# Patient Record
(demographics unavailable — no encounter records)

---

## 2024-10-08 NOTE — CARDIOLOGY SUMMARY
[de-identified] : 10/8/2024. Sinus Rhythm. Right bundle branch block. [de-identified] : 8/28/20, Normal left ventricular size and systolic function. Mild diastolic dysfunction. Mild aortic regurgitation. Mild tricuspid regurgitation. Minimal mitral regurgitation. LVEF 56%. [de-identified] : 3/13/2023.  Left main: Normal.  Ostial LAD: 20% stenosis.  Patent proximal LAD stent with 10% in-stent-stenosis.  Mid LAD: 30% stenosis. OM2: 50% stenosis with FFR 0.95.  Mid RCA: 40% stenosis.\par  --------\par  9/21/20, Left Main: Mild diffuse disease. Mid LAD: The 50% stenosis. Proximal LAD: Patent stent. Second obtuse marginal: 50% stenosis. RCA: Mild diffuse disease. LVEF is 60% \par  ------------\par  Stent: 09/02/14, LAD - stent. \par

## 2024-10-08 NOTE — PHYSICAL EXAM
[No Acute Distress] : no acute distress [Normal S1, S2] : normal S1, S2 [Clear Lung Fields] : clear lung fields [Normal Gait] : normal gait [Alert and Oriented] : alert and oriented [Obese] : obese [de-identified] : No JVD [de-identified] : I/VI murmur [de-identified] : Warm, dry

## 2024-10-08 NOTE — HISTORY OF PRESENT ILLNESS
[FreeTextEntry1] : Sydnee Ortiz is a 90-year-old woman with a history of CAD, LAD stent (2014 -- patent on cath in 2020 but also with 50% mid LAD and OM2 stenoses), mild aortic valve insufficiency, right bundle branch block, pacemaker, paroxysmal atrial fibrillation (low burden), supraventricular tachycardia, NSVT on pacemaker interrogation, hypertension, hyperlipidemia, hypothyroidism, osteoarthritis, pancreatitis, and renal calculi who returns cardiac examination.  She has been feeling well since I last saw her.  She describes mild dyspnea when climbing stairs but not with other activities; no associated chest discomfort.  She describes a good baseline functional status.  * This visit was conducted as part of ongoing, longitudinal medical care for patient's chronic medical diagnoses and other issues.

## 2024-10-08 NOTE — REVIEW OF SYSTEMS
[Joint Pain] : joint pain [Chest Discomfort] : no chest discomfort [Palpitations] : no palpitations [FreeTextEntry2] : Stable weight [FreeTextEntry5] : Dyspnea when climbing stairs

## 2024-10-08 NOTE — DISCUSSION/SUMMARY
[With Me] : with me [___ Month(s)] : in [unfilled] month(s) [FreeTextEntry1] :  Coronary artery disease: No typical angina.  Dyspnea when climbing stairs but not with other activities and without associated chest discomfort; ECG similar to last tracing; I offered her an ischemia evaluation to assess for progression of disease, but she declined--says she will contact me if symptoms change/worsen. Continue medical management with aspirin, metoprolol, and atorvastatin.  Hypertension: Controlled; continue amlodipine, HCTZ, and ramipril.  Atrial fibrillation: Paroxysmal; continue Eliquis and metoprolol.  Hyperlipidemia:  Controlled; continue atorvastatin

## 2025-05-08 NOTE — CARDIOLOGY SUMMARY
[de-identified] : 5/8/25 Sinus Rhythm. Right bundle branch block. [de-identified] : 8/28/20, Normal left ventricular size and systolic function. Mild diastolic dysfunction. Mild aortic regurgitation. Mild tricuspid regurgitation. Minimal mitral regurgitation. LVEF 56%. [de-identified] : 12/13/2017   [employer]  [de-identified] : 3/13/2023.  Left main: Normal.  Ostial LAD: 20% stenosis.  Patent proximal LAD stent with 10% in-stent-stenosis.  Mid LAD: 30% stenosis. OM2: 50% stenosis with FFR 0.95.  Mid RCA: 40% stenosis.\par  --------\par  9/21/20, Left Main: Mild diffuse disease. Mid LAD: The 50% stenosis. Proximal LAD: Patent stent. Second obtuse marginal: 50% stenosis. RCA: Mild diffuse disease. LVEF is 60% \par  ------------\par  Stent: 09/02/14, LAD - stent. \par

## 2025-05-08 NOTE — PHYSICAL EXAM
[No Acute Distress] : no acute distress [Obese] : obese [Normal S1, S2] : normal S1, S2 [Clear Lung Fields] : clear lung fields [Normal Gait] : normal gait [Alert and Oriented] : alert and oriented [Soft] : abdomen soft [Normal Bowel Sounds] : normal bowel sounds [No Edema] : no edema [No Varicosities] : no varicosities [Normal Radial B/L] : normal radial B/L [Normal PT B/L] : normal PT B/L [Moves all extremities] : moves all extremities [de-identified] : No JVD [de-identified] : I/VI murmur [de-identified] : Warm, dry

## 2025-05-08 NOTE — HISTORY OF PRESENT ILLNESS
[FreeTextEntry1] : Sydnee Ortiz is a 91-year-old woman with a history of CAD, LAD stent (2014 -- patent on cath in 2020 but also with 50% mid LAD and OM2 stenoses), mild aortic valve insufficiency, right bundle branch block, pacemaker, paroxysmal atrial fibrillation (low burden), supraventricular tachycardia, NSVT on pacemaker interrogation, hypertension, hyperlipidemia, hypothyroidism, osteoarthritis, pancreatitis, and renal calculi who returns cardiac examination. patient used to see my associate dr Pearson     She has been feeling well  until yesterday she felt like her heart rate was racing  lasted for 20 minutes ,  patient did not transfer the data , patient  denies any chest pain or shortness of breath or dizziness ,    Patient blood blood pressure optimal   * This visit was conducted as part of ongoing, longitudinal medical care for patient's chronic medical diagnoses and other issues.

## 2025-05-08 NOTE — CARDIOLOGY SUMMARY
[de-identified] : 5/8/25 Sinus Rhythm. Right bundle branch block. [de-identified] : 8/28/20, Normal left ventricular size and systolic function. Mild diastolic dysfunction. Mild aortic regurgitation. Mild tricuspid regurgitation. Minimal mitral regurgitation. LVEF 56%. [de-identified] : 12/13/2017   [employer]  [de-identified] : 3/13/2023.  Left main: Normal.  Ostial LAD: 20% stenosis.  Patent proximal LAD stent with 10% in-stent-stenosis.  Mid LAD: 30% stenosis. OM2: 50% stenosis with FFR 0.95.  Mid RCA: 40% stenosis.\par  --------\par  9/21/20, Left Main: Mild diffuse disease. Mid LAD: The 50% stenosis. Proximal LAD: Patent stent. Second obtuse marginal: 50% stenosis. RCA: Mild diffuse disease. LVEF is 60% \par  ------------\par  Stent: 09/02/14, LAD - stent. \par

## 2025-05-08 NOTE — DISCUSSION/SUMMARY
[With Me] : with me [___ Month(s)] : in [unfilled] month(s) [EKG obtained to assist in diagnosis and management of assessed problem(s)] : EKG obtained to assist in diagnosis and management of assessed problem(s) [FreeTextEntry1] : Coronary artery disease: No typical angina.  Dyspnea when climbing stairs but not with other activities and without associated chest discomfort which is stable ; ECG similar to last tracing; Continue medical management with aspirin, metoprolol, and atorvastatin.  Hypertension: Controlled; continue amlodipine, HCTZ, and ramipril.  Palpitations : likely episodes of rapid Atrial fibrillation: Paroxysmal; continue Eliquis and metoprolol.  will obtain PPM interrogated   Hyperlipidemia:  Controlled; continue atorvastatin

## 2025-05-08 NOTE — PHYSICAL EXAM
[No Acute Distress] : no acute distress [Obese] : obese [Normal S1, S2] : normal S1, S2 [Clear Lung Fields] : clear lung fields [Normal Gait] : normal gait [Alert and Oriented] : alert and oriented [Soft] : abdomen soft [Normal Bowel Sounds] : normal bowel sounds [No Edema] : no edema [No Varicosities] : no varicosities [Normal Radial B/L] : normal radial B/L [Normal PT B/L] : normal PT B/L [Moves all extremities] : moves all extremities [de-identified] : No JVD [de-identified] : I/VI murmur [de-identified] : Warm, dry

## 2025-06-18 NOTE — CARDIOLOGY SUMMARY
[de-identified] : 5/8/25 Sinus Rhythm. Right bundle branch block. [de-identified] : 8/28/20, Normal left ventricular size and systolic function. Mild diastolic dysfunction. Mild aortic regurgitation. Mild tricuspid regurgitation. Minimal mitral regurgitation. LVEF 56%. [de-identified] : 12/13/2017   [employer]  [de-identified] : 3/13/2023.  Left main: Normal.  Ostial LAD: 20% stenosis.  Patent proximal LAD stent with 10% in-stent-stenosis.  Mid LAD: 30% stenosis. OM2: 50% stenosis with FFR 0.95.  Mid RCA: 40% stenosis.\par  --------\par  9/21/20, Left Main: Mild diffuse disease. Mid LAD: The 50% stenosis. Proximal LAD: Patent stent. Second obtuse marginal: 50% stenosis. RCA: Mild diffuse disease. LVEF is 60% \par  ------------\par  Stent: 09/02/14, LAD - stent. \par

## 2025-06-18 NOTE — PHYSICAL EXAM
[No Acute Distress] : no acute distress [Obese] : obese [Normal S1, S2] : normal S1, S2 [Clear Lung Fields] : clear lung fields [Soft] : abdomen soft [Normal Bowel Sounds] : normal bowel sounds [Normal Gait] : normal gait [No Edema] : no edema [No Varicosities] : no varicosities [Normal Radial B/L] : normal radial B/L [Normal PT B/L] : normal PT B/L [Moves all extremities] : moves all extremities [Alert and Oriented] : alert and oriented [de-identified] : No JVD [de-identified] : I/VI murmur [de-identified] : Warm, dry

## 2025-06-18 NOTE — HISTORY OF PRESENT ILLNESS
[FreeTextEntry1] : Sydnee Ortiz is a 91-year-old woman with a history of CAD, LAD stent (2014 -- patent on cath in 2020 but also with 50% mid LAD and OM2 stenoses), mild aortic valve insufficiency, right bundle branch block, pacemaker, paroxysmal atrial fibrillation (low burden), supraventricular tachycardia, NSVT on pacemaker interrogation, hypertension, hyperlipidemia, hypothyroidism, osteoarthritis, pancreatitis, and renal calculi who came for follow up , Patient had palpitation  prior to last visit , PPM interrogation showed atrial fibrillation with rapid rate , metoprolol dose was increased    patient  denies any chest pain or shortness of breath or dizziness ,    Patient blood blood pressure optimal   * This visit was conducted as part of ongoing, longitudinal medical care for patient's chronic medical diagnoses and other issues.

## 2025-06-18 NOTE — DISCUSSION/SUMMARY
[With Me] : with me [___ Month(s)] : in [unfilled] month(s) [FreeTextEntry1] : Coronary artery disease: No typical angina.  Dyspnea when climbing stairs but not with other activities and without associated chest discomfort which is stable ; ECG similar to last tracing; Continue medical management with aspirin, metoprolol, and atorvastatin.  Hypertension: Controlled; continue amlodipine, HCTZ, and ramipril.  Palpitations : likely episodes of rapid Atrial fibrillation: Paroxysmal; continue Eliquis and  increased dose of metoprolol.    Hyperlipidemia:  Controlled; continue atorvastatin

## 2025-06-18 NOTE — REVIEW OF SYSTEMS
[Chest Discomfort] : no chest discomfort [Palpitations] : no palpitations [Joint Pain] : joint pain [FreeTextEntry2] : Stable weight [FreeTextEntry5] : Dyspnea when climbing stairs